# Patient Record
Sex: MALE
[De-identification: names, ages, dates, MRNs, and addresses within clinical notes are randomized per-mention and may not be internally consistent; named-entity substitution may affect disease eponyms.]

---

## 2017-08-01 ENCOUNTER — HOSPITAL ENCOUNTER (OUTPATIENT)
Dept: HOSPITAL 31 - C.ENDO | Age: 82
Discharge: HOME | End: 2017-08-01
Attending: SPECIALIST
Payer: MEDICARE

## 2017-08-01 VITALS — BODY MASS INDEX: 27.8 KG/M2

## 2017-08-01 VITALS — OXYGEN SATURATION: 99 %

## 2017-08-01 VITALS — SYSTOLIC BLOOD PRESSURE: 129 MMHG | DIASTOLIC BLOOD PRESSURE: 69 MMHG

## 2017-08-01 VITALS — HEART RATE: 60 BPM | RESPIRATION RATE: 14 BRPM

## 2017-08-01 VITALS — TEMPERATURE: 97 F

## 2017-08-01 DIAGNOSIS — K52.9: Primary | ICD-10-CM

## 2017-08-01 DIAGNOSIS — K64.8: ICD-10-CM

## 2017-08-01 PROCEDURE — 82948 REAGENT STRIP/BLOOD GLUCOSE: CPT

## 2017-08-01 PROCEDURE — 88305 TISSUE EXAM BY PATHOLOGIST: CPT

## 2017-08-01 PROCEDURE — 45380 COLONOSCOPY AND BIOPSY: CPT

## 2017-08-01 NOTE — CP.SDSHP
Same Day Surgery [facility] & P





- History


Proposed Procedure: COLONSCOPY


Pre-Op Diagnosis: SEE NOTES





- Previous Medical/Surgical History


Cardiac: Hypertension


Endocrine/Metabolic: Diabetes, Other


Misc: Other


Pain: 2.Mild Pain





- Allergies


Allergies: 


Allergies





No Known Allergies Allergy (Verified 04/28/15 07:57)


 











- Physical Exam


General Appearance: N


Vital Signs: 


 Vital Signs











  08/01/17 08/01/17





  08:24 09:09


 


Temperature 97.6 F 97.6 F


 


Pulse Rate 73 73


 


Respiratory 16 16





Rate  


 


Blood Pressure 132/66 132/66


 


O2 Sat by Pulse 97 100





Oximetry  











Mental Status: Alert & Oriented x3


Neuro: WNL


Heart: Other


Lungs: WNL


GI: Other





- {Optional Preform as Required}


Breast: WNL


Abdomen: Other


Rectal: Other


Integument: WNL


: WNL


Ortho: WNL


ENT: WNL





- Impression


Pt. Evaluated Today:Candidate for Anesthesia & Procedure: Yes





- Date & Time


Time: 09:15





Short Stay Discharge





- Short Stay Discharge


Admitting Diagnosis/Reason for Visit: HX COLON CANCER


Disposition: HOME/ ROUTINE

## 2017-08-03 ENCOUNTER — HOSPITAL ENCOUNTER (OUTPATIENT)
Dept: HOSPITAL 31 - C.ER | Age: 82
Setting detail: OBSERVATION
LOS: 2 days | Discharge: HOME | End: 2017-08-05
Attending: INTERNAL MEDICINE | Admitting: INTERNAL MEDICINE
Payer: MEDICARE

## 2017-08-03 VITALS — BODY MASS INDEX: 27.8 KG/M2
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
## 2017-08-03 DIAGNOSIS — I10: ICD-10-CM
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
## 2017-08-03 DIAGNOSIS — E78.5: ICD-10-CM
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
## 2017-08-03 DIAGNOSIS — J98.11: ICD-10-CM

## 2017-08-03 DIAGNOSIS — S22.42XA: Primary | ICD-10-CM

## 2017-08-03 LAB
ALBUMIN SERPL-MCNC: 4.2 G/DL (ref 3.5–5)
ALBUMIN/GLOB SERPL: 1.1 {RATIO} (ref 1–2.1)
ALT SERPL-CCNC: 49 U/L (ref 21–72)
AST SERPL-CCNC: 38 U/L (ref 17–59)
BASOPHILS # BLD AUTO: 0.1 K/UL (ref 0–0.2)
BASOPHILS NFR BLD: 1 % (ref 0–2)
BUN SERPL-MCNC: 23 MG/DL (ref 9–20)
CALCIUM SERPL-MCNC: 8.9 MG/DL (ref 8.6–10.4)
EOSINOPHIL # BLD AUTO: 0.1 K/UL (ref 0–0.7)
EOSINOPHIL NFR BLD: 0.7 % (ref 0–4)
ERYTHROCYTE [DISTWIDTH] IN BLOOD BY AUTOMATED COUNT: 13.1 % (ref 11.5–14.5)
GFR NON-AFRICAN AMERICAN: > 60
HGB BLD-MCNC: 13.3 G/DL (ref 12–18)
LYMPHOCYTES # BLD AUTO: 1.3 K/UL (ref 1–4.3)
LYMPHOCYTES NFR BLD AUTO: 11.8 % (ref 20–40)
MCH RBC QN AUTO: 31.8 PG (ref 27–31)
MCHC RBC AUTO-ENTMCNC: 33.8 G/DL (ref 33–37)
MCV RBC AUTO: 94.2 FL (ref 80–94)
MONOCYTES # BLD: 1.1 K/UL (ref 0–0.8)
MONOCYTES NFR BLD: 10 % (ref 0–10)
NEUTROPHILS # BLD: 8.3 K/UL (ref 1.8–7)
NEUTROPHILS NFR BLD AUTO: 76.5 % (ref 50–75)
NRBC BLD AUTO-RTO: 0 % (ref 0–2)
PLATELET # BLD: 274 K/UL (ref 130–400)
PMV BLD AUTO: 7.7 FL (ref 7.2–11.7)
RBC # BLD AUTO: 4.2 MIL/UL (ref 4.4–5.9)
WBC # BLD AUTO: 10.9 K/UL (ref 4.8–10.8)

## 2017-08-03 PROCEDURE — 80048 BASIC METABOLIC PNL TOTAL CA: CPT

## 2017-08-03 PROCEDURE — 71260 CT THORAX DX C+: CPT

## 2017-08-03 PROCEDURE — 96376 TX/PRO/DX INJ SAME DRUG ADON: CPT

## 2017-08-03 PROCEDURE — 74177 CT ABD & PELVIS W/CONTRAST: CPT

## 2017-08-03 PROCEDURE — 97116 GAIT TRAINING THERAPY: CPT

## 2017-08-03 PROCEDURE — 99285 EMERGENCY DEPT VISIT HI MDM: CPT

## 2017-08-03 PROCEDURE — 71101 X-RAY EXAM UNILAT RIBS/CHEST: CPT

## 2017-08-03 PROCEDURE — 80053 COMPREHEN METABOLIC PANEL: CPT

## 2017-08-03 PROCEDURE — 97162 PT EVAL MOD COMPLEX 30 MIN: CPT

## 2017-08-03 PROCEDURE — 96372 THER/PROPH/DIAG INJ SC/IM: CPT

## 2017-08-03 PROCEDURE — 71020: CPT

## 2017-08-03 PROCEDURE — 36415 COLL VENOUS BLD VENIPUNCTURE: CPT

## 2017-08-03 PROCEDURE — 85025 COMPLETE CBC W/AUTO DIFF WBC: CPT

## 2017-08-03 PROCEDURE — 96374 THER/PROPH/DIAG INJ IV PUSH: CPT

## 2017-08-03 PROCEDURE — 93005 ELECTROCARDIOGRAM TRACING: CPT

## 2017-08-03 PROCEDURE — 82948 REAGENT STRIP/BLOOD GLUCOSE: CPT

## 2017-08-03 RX ADMIN — INSULIN ASPART SCH UNIT: 100 INJECTION, SOLUTION INTRAVENOUS; SUBCUTANEOUS at 17:20

## 2017-08-03 RX ADMIN — INSULIN ASPART SCH UNIT: 100 INJECTION, SOLUTION INTRAVENOUS; SUBCUTANEOUS at 21:56

## 2017-08-03 RX ADMIN — TRAMADOL HYDROCHLORIDE SCH MG: 50 TABLET, FILM COATED ORAL at 15:49

## 2017-08-03 RX ADMIN — TRAMADOL HYDROCHLORIDE SCH: 50 TABLET, FILM COATED ORAL at 18:32

## 2017-08-03 NOTE — CT
CT chest, abdomen, and pelvis with IV contrast 



Indication: fall with L rib and L abdominal pain



Technique: 



Contiguous axial images of the chest, abdomen, and pelvis. Coronal 

and Sagittal reformats generated and reviewed. 



This CT exam was performed using 1 or more of the following dose 

reduction techniques: Automated exposure control, adjustment of the 

MAA and/or kV according to patient size, and/or use of iterative 

reconstruction technique. 



Oral contrast was not administered. 100 mL Visipaque.



Radiation dose:



Total exam DLP = 708.75 MGy-cm.



Comparison: None available 



Findings: 



Visualized portions of the inferior thyroid gland appear 

unremarkable. The mediastinal and hilar vascular structures appear 

within normal limits.  The heart appears within normal limits of 

size. Coronary artery calcifications. 



Mild bilateral lower lobe atelectasis. Small left pleural effusion. 

No pneumothorax. No suspicious pulmonary nodules measuring greater 

than 5 mm. 



Hypoattenuation of the liver compatible with hepatic steatosis. The 

spleen, kidneys, pancreas, adrenal glands, and gallbladder appear 

unremarkable. 



Abdominal aortic aneurysm measuring in maximal dimension 3.3 cm (AP) 

by 3.4 cm (transverse) by 5.8 cm (cc). Prominent dense peripheral 

thrombus within the aneurysm. Suspected 5 mm penetrating ulcer 

(series 2, image 78). 



Small hiatal hernia. The stomach is nondistended.



The bowel loops appear within normal limits of caliber without 

evidence of intestinal obstruction. Anastomotic bowel suture material 

involving the rectosigmoid colon. There is no definite free air.  The 

appendix is not identified. No secondary signs of acute appendicitis. 



The prostate gland measures approximately 4.0 x 4.7 cm. The urinary 

bladder appears unremarkable. 



Left posterior 9 and 10th rib fractures. 



Impression: 



Left posterior 9 and 10th rib fractures. 



Hypoattenuation of the liver compatible with hepatic steatosis.  



Abdominal aortic aneurysm measuring in maximal dimension 3.3 cm (AP) 

by 3.4 cm (transverse) by 5.8 cm (cc) containing extensive dense 

peripheral thrombus. Suspected 5 mm penetrating ulcer. 



Enlarged prostate gland.  Recommend correlation with PSA. 



Additional findings as above.

## 2017-08-03 NOTE — CP.PCM.CON
History of Present Illness





- History of Present Illness


History of Present Illness: 





Vascular Surgery- Dr. Umanzor





82M PMHx of DM, HTN, MI presented to the ED after a fall from the shower 

yesterday. Imaging revealed fracture of rib 9 and 10. Vascular surgery was 

consulted for incidental finding of AAA. Pt denies dizziness, blurry vision, 

loss of consciousness, CP, SOB, ABD pain. 





PMH: DM, HTN, MI, Colon Ca


PSH: Left colon resection 1999, nasal polyp removal, colonoscopy 2 weeks ago 

negative


ALL: NKDA


SocialHx: denies history or current tobacco use. 








Review of Systems





- Review of Systems


All systems: reviewed and no additional remarkable complaints except





Past Patient History





- Past Medical History & Family History


Past Medical History?: Yes





- Past Social History


Smoking Status: Never Smoked





- CARDIAC


Hx Hypercholesterolemia: Yes


Hx Hypertension: Yes





- PULMONARY


Hx Respiratory Disorders: No





- NEUROLOGICAL


Hx Transient Ischemic Attacks (TIA): Yes (NO RESIDUAL WEAKNESS)





- HEENT


Hx HEENT Problems: No





- RENAL


Hx Chronic Kidney Disease: No





- ENDOCRINE/METABOLIC


Hx Endocrine Disorders: Yes


Hx Diabetes Mellitus Type 2: Yes





- HEMATOLOGICAL/ONCOLOGICAL


Hx Blood Disorders: Yes


Hx Cancer: Yes (COLON)





- INTEGUMENTARY


Hx Dermatological Problems: No





- MUSCULOSKELETAL/RHEUMATOLOGICAL


Hx Musculoskeletal Disorders: No


Hx Falls: Yes





- GASTROINTESTINAL


Hx Gastrointestinal Disorders: Yes


Hx Bowel Surgery: Yes (COLON RESECTION in 1999)





- GENITOURINARY/GYNECOLOGICAL


Hx Genitourinary Disorders: Yes


Hx Prostate Problems: Yes (BPH)





- PSYCHIATRIC


Hx Substance Use: No





- SURGICAL HISTORY


Hx Surgeries: Yes (TRACHEOSTOMY)


Other/Comment: SKIN CA OF NOSE had a sx of removal of tumor in July 2017





- ANESTHESIA


Hx Anesthesia: Yes


Hx Anesthesia Reactions: No


Hx Malignant Hyperthermia: No





Meds


Allergies/Adverse Reactions: 


 Allergies











Allergy/AdvReac Type Severity Reaction Status Date / Time


 


No Known Allergies Allergy   Verified 08/03/17 07:02














- Medications


Medications: 


 Current Medications





Enoxaparin Sodium (Lovenox)  40 mg SC DAILY [facility]


Insulin Aspart (Novolog)  0 unit SC ACHS [facility]


   PRN Reason: Protocol


Lisinopril (Zestril)  10 mg PO DAILY [facility]


Metformin HCl (Glucophage)  500 mg PO BIDCC [facility]


Morphine Sulfate (Morphine)  2 mg IVP Q4 PRN


   PRN Reason: Pain, severe (8-10)


Rosuvastatin Calcium (Crestor)  10 mg PO HS [facility]


Tamsulosin HCl (Flomax)  0.4 mg PO HS [facility]


Tramadol HCl (Ultram)  25 mg PO TID [facility]


   Last Admin: 08/03/17 15:49 Dose:  25 mg











Physical Exam





- Constitutional


Appears: No Acute Distress





- Head Exam


Head Exam: ATRAUMATIC





- ENT Exam


ENT Exam: Mucous Membranes Moist





- Respiratory Exam


Respiratory Exam: NORMAL BREATHING PATTERN.  absent: Accessory Muscle Use, 

Rhonchi, Wheezes, Respiratory Distress





- Cardiovascular Exam


Cardiovascular Exam: REGULAR RHYTHM, +S1, +S2





- GI/Abdominal Exam


GI & Abdominal Exam: Distended, Soft.  absent: Bruit


Additional comments: 





midline incision scar


no alexandria, grey-huertas, or calderon sign





- Extremities Exam


Extremities exam: Negative for: pedal edema, tenderness


Additional comments: 





Femoral & Radial +2 pulses





- Neurological Exam


Neurological exam: Alert, Oriented x3





- Psychiatric Exam


Psychiatric exam: Normal Affect





- Skin


Skin Exam: Normal Color





Results





- Vital Signs


Recent Vital Signs: 


 Last Vital Signs











Temp  99.0 F   08/03/17 11:15


 


Pulse  63   08/03/17 11:15


 


Resp  17   08/03/17 11:15


 


BP  157/88 H  08/03/17 11:15


 


Pulse Ox  94 L  08/03/17 11:43














- Labs


Result Diagrams: 


 08/03/17 07:48





 08/03/17 07:48





Assessment & Plan





- Assessment and Plan (Free Text)


Assessment: 





82M s/p mechanical fall consulted for incidental AAA finding on CT- currently 

asx


CT: left 9, 10th rib fx. 3.3 x 3.4 cm in diameter


Plan: 





- continue medical management for mechanical fall


- No surgical intervention at this time 


- will re-evaluate in AM





D/W Dr. Erna Orellana PGY1

## 2017-08-03 NOTE — CP.PCM.HP
History of Present Illness





- History of Present Illness


History of Present Illness: 





83 y/o hm with t2dm,htn and hyperlipidemia, he had a fall while getting out of 

the shower room and he slipped and his his upper back, no head injury, no 

dizziness, no loc, no cough, no fever





Present on Admission





- Present on Admission


Any Indicators Present on Admission: Yes


History of DVT/PE: No


History of Uncontrolled Diabetes: Yes


Urinary Catheter: No


Decubitus Ulcer Present: No





Review of Systems





- EENT


Nose/Mouth/Throat: Nasal Congestion





- Cardiovascular


Cardiovascular: Leg Edema





- Gastrointestinal


Gastrointestinal: Bloating





- Genitourinary


Genitourinary: Urinary Urgency





- Musculoskeletal


Musculoskeletal: Arthralgias





- Neurological


Neurological: Weakness





- Endocrine


Endocrine: Fatigue





Past Patient History





- Past Medical History & Family History


Past Medical History?: Yes





- Past Social History


Smoking Status: Never Smoked





- CARDIAC


Hx Hypercholesterolemia: Yes


Hx Hypertension: Yes





- PULMONARY


Hx Respiratory Disorders: No





- NEUROLOGICAL


Hx Transient Ischemic Attacks (TIA): Yes (NO RESIDUAL WEAKNESS)





- HEENT


Hx HEENT Problems: No





- RENAL


Hx Chronic Kidney Disease: No





- ENDOCRINE/METABOLIC


Hx Endocrine Disorders: Yes


Hx Diabetes Mellitus Type 2: Yes





- HEMATOLOGICAL/ONCOLOGICAL


Hx Blood Disorders: Yes


Hx Cancer: Yes (COLON)





- INTEGUMENTARY


Hx Dermatological Problems: No





- MUSCULOSKELETAL/RHEUMATOLOGICAL


Hx Musculoskeletal Disorders: No


Hx Falls: Yes





- GASTROINTESTINAL


Hx Gastrointestinal Disorders: Yes


Hx Bowel Surgery: Yes (COLON RESECTION in 1999)





- GENITOURINARY/GYNECOLOGICAL


Hx Genitourinary Disorders: Yes


Hx Prostate Problems: Yes (BPH)





- PSYCHIATRIC


Hx Substance Use: No





- SURGICAL HISTORY


Hx Surgeries: Yes (TRACHEOSTOMY)


Other/Comment: SKIN CA OF NOSE had a sx of removal of tumor in July 2017





- ANESTHESIA


Hx Anesthesia: Yes


Hx Anesthesia Reactions: No


Hx Malignant Hyperthermia: No





Meds


Allergies/Adverse Reactions: 


 Allergies











Allergy/AdvReac Type Severity Reaction Status Date / Time


 


No Known Allergies Allergy   Verified 08/03/17 07:02














Physical Exam





- Constitutional


Appears: Non-toxic, No Acute Distress





- Head Exam


Head Exam: ATRAUMATIC, NORMAL INSPECTION, NORMOCEPHALIC





- Eye Exam


Eye Exam: EOMI, Normal appearance, PERRL


Pupil Exam: NORMAL ACCOMODATION





- ENT Exam


ENT Exam: Mucous Membranes Moist, Normal Exam, Normal Oropharynx, TM's Normal 

Bilaterally





- Neck Exam


Neck exam: Positive for: Normal Inspection





- Respiratory Exam


Respiratory Exam: Clear to Auscultation Bilateral, NORMAL BREATHING PATTERN





- Cardiovascular Exam


Cardiovascular Exam: REGULAR RHYTHM, +S1, +S2





- GI/Abdominal Exam


GI & Abdominal Exam: Normal Bowel Sounds, Soft





- Rectal Exam


Rectal Exam: NORMAL INSPECTION





-  Exam


 Exam: NORMAL INSPECTION


External exam: NORMAL EXTERNAL EXAM





- Back Exam


Back exam: CVA tenderness (L), NORMAL INSPECTION, paraspinal tenderness





- Psychiatric Exam


Psychiatric exam: Normal Affect, Normal Mood





- Skin


Skin Exam: Intact





Results





- Vital Signs


Recent Vital Signs: 





 Last Vital Signs











Temp  98.3 F   08/03/17 15:00


 


Pulse  67   08/03/17 15:00


 


Resp  20   08/03/17 15:00


 


BP  158/75 H  08/03/17 15:00


 


Pulse Ox  96   08/03/17 15:00














- Labs


Result Diagrams: 


 08/03/17 07:48





 08/03/17 07:48


Labs: 





 Laboratory Results - last 24 hr











  08/03/17 08/03/17





  17:27 21:42


 


POC Glucose (mg/dL)  495 H*  394 H














Assessment & Plan


(1) Diabetes mellitus


Status: Chronic   Priority: High   





(2) Hypertension


Status: Chronic   





(3) Fall


Status: Acute   





(4) Ribs, multiple fractures


Assessment and Plan: 


r posterior 9th and 10 rib fracture


Status: Acute

## 2017-08-03 NOTE — C.PDOC
History Of Present Illness


81 y/o male presents to the ED for evaluation of left side rib pain s/p slip 

and fall yesterday. Patient states he slipped on water, and fell in the 

bathroom yesterday, hitting the left side of his body against the tub. States 

that pain became worse over night, which prompted him to visit ED this morning. 

Notes that pain is worse with deep breathing. Otherwise, denies any head injury

, LOC, headache, dizziness, nausea, vomiting, neck pain, back pain, or any 

other associated symptoms at this time.








- HPI


Time Seen by Provider: 08/03/17 07:13


Chief Complaint (Nursing): Rib Injury


History Per: Patient


History/Exam Limitations: no limitations


Onset/Duration Of Symptoms: Days (1)


Injury Occurred (Timing): Days Ago: (1)


Location Of Injury: Left: Chest (left side rib)


Recent travel outside of the [place]: No


Additional History Per: Patient





- Fall


Fall:Prior To Injury: Slipped





Past Medical History


Reviewed: Historical Data, Nursing Documentation, Vital Signs


Vital Signs: 


 Last Vital Signs











Temp  99.0 F   08/03/17 11:15


 


Pulse  63   08/03/17 11:15


 


Resp  17   08/03/17 11:15


 


BP  157/88 H  08/03/17 11:15


 


Pulse Ox  94 L  08/03/17 11:15














- Medical History


PMH: Colonic Polyps, HTN, Hypercholesterolemia, TIA (NO RESIDUAL WEAKNESS)


   Denies: Chronic Kidney Disease


Surgical History: Endoscopy





- [facility] Procedures








CLOSED ENDOSCOPIC BIOPSY OF LARGE INTESTINE (04/28/15)








Family History: States: Unknown Family Hx





- Social History


Hx Alcohol Use: No


Hx Substance Use: No





- Immunization History


Hx Influenza Vaccination: Yes


Hx Pneumococcal Vaccination: Yes





Review Of Systems


Except As Marked, All Systems Reviewed And Found Negative.


Constitutional: Negative for: Fever, Chills


Cardiovascular: Positive for: Chest Pain (left rib pain).  Negative for: 

Palpitations, Light Headedness


Respiratory: Positive for: Shortness of Breath.  Negative for: Cough


Gastrointestinal: Positive for: Abdominal Pain (left side).  Negative for: 

Nausea, Vomiting, Diarrhea, Constipation


Musculoskeletal: Negative for: Neck Pain, Back Pain


Skin: Negative for: Rash, Bruising


Neurological: Negative for: Weakness, Numbness, Headache, Dizziness





Physical Exam





- Physical Exam


Appears: Non-toxic, No Acute Distress


Skin: Normal Color, Warm, Dry


Head: Atraumatic, Normacephalic, No Tenderness


Eye(s): bilateral: Normal Inspection


Oral Mucosa: Moist


Neck: Normal, Normal ROM, No Midline Cervical Tenderness, Supple


Chest: Symmetrical, No Deformity, Tenderness (bony left rib tenderness)


Cardiovascular: Rhythm Regular, No Murmur


Respiratory: Normal Breath Sounds, No Accessory Muscle Use, No Rales, No Rhonchi

, No Wheezing


Gastrointestinal/Abdominal: Soft, Tenderness (left side), No Guarding, No 

Rebound


Back: Normal Inspection


Extremity: Bilateral: Atraumatic, Normal ROM


Neurological/Psych: Oriented x3, Normal Speech, Normal Cognition





ED Course And Treatment





- Laboratory Results


Result Diagrams: 


 08/03/17 07:48





 08/03/17 07:48


O2 Sat by Pulse Oximetry: 94


Progress Note: Blood work, left side ribs/chest x-ray ordered and reviewed.  

Chest, abd, pel CT ordered to rule out any intra-abdominal pathology.  Patient 

was treated with Toradol. On re-eval, patient is resting comfortably. Reports 

some improvement of pain.





Medical Decision Making


Medical Decision Making: 





Cxray and L rib xray: "Unremarkable radiographs of the chest and left ribs. No 

left rib fracture."





11:12AM





CT shows





"Visualized portions of the inferior thyroid gland appear unremarkable. The 

mediastinal and hilar vascular structures appear within normal limits.  The 

heart appears within normal limits of size. Coronary artery calcifications. 





Mild bilateral lower lobe atelectasis. Small left pleural effusion. No 

pneumothorax. No suspicious pulmonary nodules measuring greater than 5 mm. 





Hypoattenuation of the liver compatible with hepatic steatosis. The spleen, 

kidneys, pancreas, adrenal glands, and gallbladder appear unremarkable. 





Abdominal aortic aneurysm measuring in maximal dimension 3.3 cm (AP) by 3.4 cm (

transverse) by 5.8 cm (cc). Prominent dense peripheral thrombus within the 

aneurysm. Suspected 5 mm penetrating ulcer (series 2, image 78). 





Small hiatal hernia. The stomach is nondistended.





The bowel loops appear within normal limits of caliber without evidence of 

intestinal obstruction. Anastomotic bowel suture material involving the 

rectosigmoid colon. There is no definite free air.  The appendix is not 

identified. No secondary signs of acute appendicitis. 





The prostate gland measures approximately 4.0 x 4.7 cm. The urinary bladder 

appears unremarkable. 





Left posterior 9 and 10th rib fractures. "





Called Dr. Odom, covering for patient's PMD.  Patient is a 82 with current 

O2 sat of 94 with multiple rib fractures and reporting persistent pain. Will 

transfer to med/sx observation for pain medication and incentive spirometry





Disposition





- Disposition


Disposition: HOSPITALIZED


Disposition Time: 11:14


Condition: FAIR





- Clinical Impression


Clinical Impression: 


 Ribs, multiple fractures, Fall








- Scribe Statement


The provider has reviewed the documentation as recorded by the Scribe





Joanne Villatoro





All medical record entries made by the Stormyibtiffanie were at my direction and 

personally dictated by me. I have reviewed the chart and agree that the record 

accurately reflects my personal performance of the history, physical exam, 

medical decision making, and the department course for this patient. I have 

also personally directed, reviewed, and agree with the discharge instructions 

and disposition.

## 2017-08-04 VITALS — RESPIRATION RATE: 20 BRPM

## 2017-08-04 LAB
BASOPHILS # BLD AUTO: 0 K/UL (ref 0–0.2)
BASOPHILS NFR BLD: 0.4 % (ref 0–2)
BUN SERPL-MCNC: 20 MG/DL (ref 9–20)
BUN SERPL-MCNC: 21 MG/DL (ref 9–20)
CALCIUM SERPL-MCNC: 9.3 MG/DL (ref 8.6–10.4)
CALCIUM SERPL-MCNC: 9.3 MG/DL (ref 8.6–10.4)
EOSINOPHIL # BLD AUTO: 0.1 K/UL (ref 0–0.7)
EOSINOPHIL NFR BLD: 0.9 % (ref 0–4)
ERYTHROCYTE [DISTWIDTH] IN BLOOD BY AUTOMATED COUNT: 13.1 % (ref 11.5–14.5)
GFR NON-AFRICAN AMERICAN: > 60
GFR NON-AFRICAN AMERICAN: > 60
HGB BLD-MCNC: 12.6 G/DL (ref 12–18)
LYMPHOCYTES # BLD AUTO: 1.1 K/UL (ref 1–4.3)
LYMPHOCYTES NFR BLD AUTO: 10.9 % (ref 20–40)
MCH RBC QN AUTO: 31.8 PG (ref 27–31)
MCHC RBC AUTO-ENTMCNC: 33.7 G/DL (ref 33–37)
MCV RBC AUTO: 94.2 FL (ref 80–94)
MONOCYTES # BLD: 1 K/UL (ref 0–0.8)
MONOCYTES NFR BLD: 9.9 % (ref 0–10)
NEUTROPHILS # BLD: 8 K/UL (ref 1.8–7)
NEUTROPHILS NFR BLD AUTO: 77.9 % (ref 50–75)
NRBC BLD AUTO-RTO: 0 % (ref 0–2)
PLATELET # BLD: 261 K/UL (ref 130–400)
PMV BLD AUTO: 7.9 FL (ref 7.2–11.7)
RBC # BLD AUTO: 3.95 MIL/UL (ref 4.4–5.9)
WBC # BLD AUTO: 10.2 K/UL (ref 4.8–10.8)

## 2017-08-04 RX ADMIN — TRAMADOL HYDROCHLORIDE SCH MG: 50 TABLET, FILM COATED ORAL at 17:21

## 2017-08-04 RX ADMIN — TRAMADOL HYDROCHLORIDE SCH MG: 50 TABLET, FILM COATED ORAL at 11:16

## 2017-08-04 RX ADMIN — INSULIN ASPART SCH UNIT: 100 INJECTION, SOLUTION INTRAVENOUS; SUBCUTANEOUS at 21:35

## 2017-08-04 RX ADMIN — TRAMADOL HYDROCHLORIDE SCH: 50 TABLET, FILM COATED ORAL at 15:18

## 2017-08-04 RX ADMIN — INSULIN ASPART SCH UNIT: 100 INJECTION, SOLUTION INTRAVENOUS; SUBCUTANEOUS at 08:19

## 2017-08-04 RX ADMIN — INSULIN ASPART SCH: 100 INJECTION, SOLUTION INTRAVENOUS; SUBCUTANEOUS at 17:18

## 2017-08-04 RX ADMIN — ENOXAPARIN SODIUM SCH MG: 40 INJECTION SUBCUTANEOUS at 11:15

## 2017-08-04 RX ADMIN — INSULIN ASPART SCH UNIT: 100 INJECTION, SOLUTION INTRAVENOUS; SUBCUTANEOUS at 11:26

## 2017-08-04 NOTE — CARD
--------------- APPROVED REPORT --------------





EKG Measurement

Heart [id number]

AK [id number]

[id number]

[id number]

[id number]



<Conclusion>

Sinus rhythm with sinus arrhythmia with 1st degree AV block

Left axis deviation

Right bundle branch block

Abnormal ECG

## 2017-08-04 NOTE — CP.PCM.PN
Subjective





- Date & Time of Evaluation


Date of Evaluation: 08/04/17





- Subjective


Subjective: 





FALL WITH R POSTERIOR RIB FRACTUURE AND HE IS IN PAIN, HIGH K NOW AND GIVEN 

KAYAXALATE





Objective





- Vital Signs/Intake and Output


Vital Signs (last 24 hours): 


 











Temp Pulse Resp BP Pulse Ox


 


 98.1 F   81   20   166/91 H  96 


 


 08/04/17 16:00  08/04/17 16:00  08/04/17 16:00  08/04/17 16:00  08/04/17 16:00








Intake and Output: 


 











 08/04/17 08/05/17





 18:59 06:59


 


Intake Total 400 250


 


Balance 400 250














- Medications


Medications: 


 Current Medications





Amlodipine Besylate (Norvasc)  5 mg PO DAILY [facility]


   Last Admin: 08/04/17 11:16 Dose:  5 mg


Enoxaparin Sodium (Lovenox)  40 mg SC DAILY [facility]


   Last Admin: 08/04/17 11:15 Dose:  40 mg


Insulin Aspart (Novolog)  0 unit SC ACHS [facility]


   PRN Reason: Protocol


   Last Admin: 08/04/17 21:35 Dose:  2 unit


Metformin HCl (Glucophage)  500 mg PO BIDCC [facility]


Morphine Sulfate (Morphine)  2 mg IVP Q4 PRN


   PRN Reason: Pain, severe (8-10)


   Last Admin: 08/04/17 15:16 Dose:  2 mg


Rosuvastatin Calcium (Crestor)  10 mg PO HS [facility]


   Last Admin: 08/04/17 21:10 Dose:  10 mg


Tamsulosin HCl (Flomax)  0.4 mg PO HS [facility]


   Last Admin: 08/04/17 21:10 Dose:  0.4 mg


Tramadol HCl (Ultram)  25 mg PO TID [facility]


   Last Admin: 08/04/17 17:21 Dose:  25 mg











- Labs


Labs: 


 





 08/04/17 07:50 





 08/04/17 17:59 











- Constitutional


Appears: Non-toxic, No Acute Distress





- Head Exam


Head Exam: ATRAUMATIC, NORMAL INSPECTION, NORMOCEPHALIC





- Eye Exam


Eye Exam: EOMI


Pupil Exam: NORMAL ACCOMODATION





- ENT Exam


ENT Exam: Mucous Membranes Moist, Normal Exam, Normal Oropharynx, TM's Normal 

Bilaterally





- Neck Exam


Neck Exam: Normal Inspection





- Respiratory Exam


Respiratory Exam: Clear to Ausculation Bilateral, NORMAL BREATHING PATTERN





- Cardiovascular Exam


Cardiovascular Exam: REGULAR RHYTHM, +S1, +S2





- GI/Abdominal Exam


GI & Abdominal Exam: Soft, Normal Bowel Sounds





- Rectal Exam


Rectal Exam: NORMAL INSPECTION





- Extremities Exam


Extremities Exam: Normal Capillary Refill, Normal Inspection





- Neurological Exam


Neurological Exam: CN II-XII Intact, Oriented x3





- Psychiatric Exam


Psychiatric exam: Normal Affect, Normal Mood





- Skin


Skin Exam: Dry





Assessment and Plan


(1) Diabetes mellitus


Status: Chronic   





(2) Hypertension


Status: Chronic   





(3) Fall


Status: Acute   





(4) Ribs, multiple fractures


Status: Acute

## 2017-08-05 VITALS
OXYGEN SATURATION: 93 % | SYSTOLIC BLOOD PRESSURE: 149 MMHG | HEART RATE: 92 BPM | DIASTOLIC BLOOD PRESSURE: 83 MMHG | TEMPERATURE: 97.6 F

## 2017-08-05 LAB
BUN SERPL-MCNC: 22 MG/DL (ref 9–20)
CALCIUM SERPL-MCNC: 9 MG/DL (ref 8.6–10.4)
GFR NON-AFRICAN AMERICAN: > 60

## 2017-08-05 RX ADMIN — ENOXAPARIN SODIUM SCH MG: 40 INJECTION SUBCUTANEOUS at 10:51

## 2017-08-05 RX ADMIN — INSULIN ASPART SCH UNIT: 100 INJECTION, SOLUTION INTRAVENOUS; SUBCUTANEOUS at 08:22

## 2017-08-05 RX ADMIN — TRAMADOL HYDROCHLORIDE SCH MG: 50 TABLET, FILM COATED ORAL at 09:00

## 2017-08-05 RX ADMIN — TRAMADOL HYDROCHLORIDE SCH MG: 50 TABLET, FILM COATED ORAL at 13:12

## 2017-08-05 RX ADMIN — INSULIN ASPART SCH UNIT: 100 INJECTION, SOLUTION INTRAVENOUS; SUBCUTANEOUS at 12:46

## 2017-08-05 NOTE — RAD
Chest x-ray two views 



History: Rib fracture. 



Comparison: CT dated 08/03/2017 



Findings: 



Persistent fracture deformities of multiple left lower thoracic ribs. 



Tortuous and ectatic aorta. 



Cardiomegaly. 



Left basilar atelectasis and or infiltrate with question trace left 

pleural effusion. 



No gross pneumothorax. 



Mild patchy right basilar atelectasis. 



Few distended loops of small bowel in the upper abdomen. 



Degenerative changes in the spine and shoulders. 



Impression: 



Persistent fracture deformities of multiple left lower thoracic ribs. 



Tortuous and ectatic aorta. 



Cardiomegaly. 



Left basilar atelectasis and or infiltrate with question trace left 

pleural effusion. 



No gross pneumothorax. 



Mild patchy right basilar atelectasis. 



Few distended loops of small bowel in the upper abdomen.

## 2017-08-06 NOTE — CP.PCM.DIS
Provider





- Provider


Date of Admission: 


08/03/17 11:15





Attending physician: 


Jose Odom MD








Diagnosis





- Discharge Diagnosis


(1) Diabetes mellitus


Status: Chronic   Priority: High   





(2) Hypertension


Status: Chronic   





(3) Fall


Status: Acute   





(4) Ribs, multiple fractures


Status: Acute   





Hospital Course





- Lab Results


Lab Results: 


 Most Recent Lab Values











WBC  10.2 K/uL (4.8-10.8)   08/04/17  07:50    


 


RBC  3.95 Mil/uL (4.40-5.90)  L  08/04/17  07:50    


 


Hgb  12.6 g/dL (12.0-18.0)   08/04/17  07:50    


 


Hct  37.2 % (35.0-51.0)   08/04/17  07:50    


 


MCV  94.2 fL (80.0-94.0)  H  08/04/17  07:50    


 


MCH  31.8 pg (27.0-31.0)  H  08/04/17  07:50    


 


MCHC  33.7 g/dL (33.0-37.0)   08/04/17  07:50    


 


RDW  13.1 % (11.5-14.5)   08/04/17  07:50    


 


Plt Count  261 K/uL (130-400)   08/04/17  07:50    


 


MPV  7.9 fL (7.2-11.7)   08/04/17  07:50    


 


Neut % (Auto)  77.9 % (50.0-75.0)  H  08/04/17  07:50    


 


Lymph % (Auto)  10.9 % (20.0-40.0)  L  08/04/17  07:50    


 


Mono % (Auto)  9.9 % (0.0-10.0)   08/04/17  07:50    


 


Eos % (Auto)  0.9 % (0.0-4.0)   08/04/17  07:50    


 


Baso % (Auto)  0.4 % (0.0-2.0)   08/04/17  07:50    


 


Neut #  8.0 K/uL (1.8-7.0)  H  08/04/17  07:50    


 


Lymph #  1.1 K/uL (1.0-4.3)   08/04/17  07:50    


 


Mono #  1.0 K/uL (0.0-0.8)  H  08/04/17  07:50    


 


Eos #  0.1 K/uL (0.0-0.7)   08/04/17  07:50    


 


Baso #  0.0 K/uL (0.0-0.2)   08/04/17  07:50    


 


Sodium  134 mmol/L (132-148)   08/05/17  08:10    


 


Potassium  5.2 mmol/L (3.6-5.2)   08/05/17  08:10    


 


Chloride  94 mmol/L ()  L  08/05/17  08:10    


 


Carbon Dioxide  29 mmol/L (22-30)   08/05/17  08:10    


 


Anion Gap  17  (10-20)   08/05/17  08:10    


 


BUN  22 mg/dL (9-20)  H  08/05/17  08:10    


 


Creatinine  1.0 MG/DL (0.8-1.5)   08/05/17  08:10    


 


Est GFR ( Amer)  > 60   08/05/17  08:10    


 


Est GFR (Non-Af Amer)  > 60   08/05/17  08:10    


 


POC Glucose (mg/dL)  415 mg/dL ()  H*  08/05/17  11:01    


 


Random Glucose  333 mg/dL ()  H  08/05/17  08:10    


 


Calcium  9.0 mg/dl (8.6-10.4)   08/05/17  08:10    


 


Total Bilirubin  0.9 mg/dL (0.2-1.3)   08/03/17  07:48    


 


AST  38 U/L (17-59)   08/03/17  07:48    


 


ALT  49 U/L (21-72)   08/03/17  07:48    


 


Alkaline Phosphatase  67 U/L ()   08/03/17  07:48    


 


Total Protein  7.9 g/dL (6.3-8.3)   08/03/17  07:48    


 


Albumin  4.2 g/dL (3.5-5.0)   08/03/17  07:48    


 


Globulin  3.7 gm/dL (2.2-3.9)   08/03/17  07:48    


 


Albumin/Globulin Ratio  1.1  (1.0-2.1)   08/03/17  07:48    














- Hospital Course


Hospital Course: 





ADMITTED WITH FALL AND HE WAS GIVEN PAIN MEDS PT AND STABILIZED AND DISCHARGED





Discharge Exam





- Head Exam


Head Exam: ATRAUMATIC, NORMAL INSPECTION, NORMOCEPHALIC





- Eye Exam


Eye Exam: EOMI, Normal appearance


Pupil Exam: NORMAL ACCOMODATION, PERRL





- ENT Exam


ENT Exam: Mucous Membranes Moist





- Respiratory Exam


Respiratory Exam: Rales, NORMAL BREATHING PATTERN, UNREMARKABLE





- Cardiovascular Exam


Cardiovascular Exam: REGULAR RHYTHM, +S1, +S2





- GI/Abdominal Exam


GI & Abdominal Exam: Normal Bowel Sounds, Unremarkable





- Rectal Exam


Rectal Exam: NORMAL INSPECTION





-  Exam


 Exam: NORMAL INSPECTION





- Extremities Exam


Extremities exam: pedal edema





- Neurological Exam


Neurological exam: Alert, CN II-XII Intact, Normal Gait, Oriented x3, Reflexes 

Normal





- Psychiatric Exam


Psychiatric exam: Normal Affect





- Skin


Skin Exam: Normal Color, Warm





Discharge Plan





- Discharge Medications


Prescriptions: 


Lidocaine 5% [Lidoderm] 1 ea TD DAILY #10 patch


amLODIPine [Norvasc] 5 mg PO DAILY #30 tab


traMADol [Ultram] 50 mg PO TID #15 tab





- Follow Up Plan


Condition: FAIR


Disposition: HOME/ ROUTINE


Instructions:  Lidocaine (On the skin), Amlodipine (By mouth), Tramadol (By 

mouth), Rib Fracture (DC), Rib Fracture (GEN), Diabetes Mellitus Type 2 in 

Adults (DC), Hypertension (DC), Fall Prevention (DC)


Additional Instructions: 


Please f/u with Dr. Odom office in 1 week


continue medication as per Med. REc.


[facility] SERVICE FOR HOME PT

## 2018-11-06 ENCOUNTER — HOSPITAL ENCOUNTER (OUTPATIENT)
Dept: HOSPITAL 31 - C.ENDO | Age: 83
Discharge: HOME | End: 2018-11-06
Attending: SPECIALIST
Payer: MEDICARE

## 2018-11-06 VITALS — TEMPERATURE: 98 F

## 2018-11-06 VITALS — HEART RATE: 75 BPM | SYSTOLIC BLOOD PRESSURE: 128 MMHG | DIASTOLIC BLOOD PRESSURE: 70 MMHG | RESPIRATION RATE: 13 BRPM

## 2018-11-06 VITALS — OXYGEN SATURATION: 99 %

## 2018-11-06 VITALS — BODY MASS INDEX: 27.8 KG/M2

## 2018-11-06 DIAGNOSIS — D12.0: ICD-10-CM

## 2018-11-06 DIAGNOSIS — D12.3: ICD-10-CM

## 2018-11-06 DIAGNOSIS — Z85.038: ICD-10-CM

## 2018-11-06 DIAGNOSIS — D12.2: ICD-10-CM

## 2018-11-06 DIAGNOSIS — Z12.11: Primary | ICD-10-CM

## 2018-11-06 DIAGNOSIS — D12.4: ICD-10-CM

## 2018-11-06 DIAGNOSIS — D12.7: ICD-10-CM

## 2018-11-06 DIAGNOSIS — K57.30: ICD-10-CM

## 2018-11-06 PROCEDURE — 88305 TISSUE EXAM BY PATHOLOGIST: CPT

## 2018-11-06 PROCEDURE — 82948 REAGENT STRIP/BLOOD GLUCOSE: CPT

## 2018-11-06 PROCEDURE — 45380 COLONOSCOPY AND BIOPSY: CPT

## 2018-11-06 NOTE — CP.SDSHP
Same Day Surgery [facility] & P





- History


Proposed Procedure: COLONSCOPY


Pre-Op Diagnosis: SEE NOTES





- Previous Medical/Surgical History


Cardiac: Hypertension


Endocrine/Metabolic: Diabetes, Other


Misc: Other


Pain: 4.Moderate Pain





- Allergies


Allergies: 


Allergies





No Known Allergies Allergy (Verified 08/03/17 07:02)


   











- Physical Exam


General Appearance: N


Vital Signs: 


                                   Vital Signs











  11/06/18





  06:53


 


Temperature 97 F L


 


Pulse Rate 80


 


Respiratory 20





Rate 


 


Blood Pressure 139/67


 


O2 Sat by Pulse 97





Oximetry 











Mental Status: Alert & Oriented x3


Neuro: WNL


Heart: Other


Lungs: WNL


GI: Other





- {Optional Preform as Required}


Breast: WNL


Abdomen: Other


Rectal: Other


Integument: WNL


: Other


Ortho: WNL


ENT: WNL





- Impression


Pt. Evaluated Today:Candidate for Anesthesia & Procedure: Yes





- Date & Time


Time: 08:02





Short Stay Discharge





- Short Stay Discharge


Admitting Diagnosis/Reason for Visit: H/O COLON CA


Disposition: HOME/ ROUTINE